# Patient Record
Sex: MALE | Race: OTHER | HISPANIC OR LATINO | URBAN - NONMETROPOLITAN AREA
[De-identification: names, ages, dates, MRNs, and addresses within clinical notes are randomized per-mention and may not be internally consistent; named-entity substitution may affect disease eponyms.]

---

## 2023-05-01 ENCOUNTER — OFFICE VISIT (OUTPATIENT)
Dept: FAMILY MEDICINE CLINIC | Facility: CLINIC | Age: 23
End: 2023-05-01
Payer: MEDICAID

## 2023-05-01 VITALS
BODY MASS INDEX: 28.63 KG/M2 | RESPIRATION RATE: 18 BRPM | SYSTOLIC BLOOD PRESSURE: 148 MMHG | WEIGHT: 216 LBS | TEMPERATURE: 97.8 F | HEART RATE: 86 BPM | HEIGHT: 73 IN | DIASTOLIC BLOOD PRESSURE: 94 MMHG | OXYGEN SATURATION: 99 %

## 2023-05-01 DIAGNOSIS — R52 BODY ACHES: Primary | ICD-10-CM

## 2023-05-01 DIAGNOSIS — U07.1 FEVER DUE TO COVID-19: ICD-10-CM

## 2023-05-01 DIAGNOSIS — R50.81 FEVER DUE TO COVID-19: ICD-10-CM

## 2023-05-01 DIAGNOSIS — U07.1 COVID-19 VIRUS INFECTION: ICD-10-CM

## 2023-05-01 DIAGNOSIS — G44.52 NEW DAILY PERSISTENT HEADACHE: ICD-10-CM

## 2023-05-01 LAB
EXPIRATION DATE: ABNORMAL
FLUAV AG UPPER RESP QL IA.RAPID: NOT DETECTED
FLUBV AG UPPER RESP QL IA.RAPID: NOT DETECTED
INTERNAL CONTROL: ABNORMAL
Lab: ABNORMAL
SARS-COV-2 AG UPPER RESP QL IA.RAPID: DETECTED

## 2023-05-01 PROCEDURE — 99203 OFFICE O/P NEW LOW 30 MIN: CPT | Performed by: PSYCHOLOGIST

## 2023-05-01 PROCEDURE — 87428 SARSCOV & INF VIR A&B AG IA: CPT | Performed by: PSYCHOLOGIST

## 2023-05-01 RX ORDER — PREDNISONE 20 MG/1
20 TABLET ORAL
Qty: 10 TABLET | Refills: 0 | Status: SHIPPED | OUTPATIENT
Start: 2023-05-01 | End: 2023-05-06

## 2023-05-01 RX ORDER — CETIRIZINE HYDROCHLORIDE 10 MG/1
10 TABLET ORAL DAILY
Qty: 14 TABLET | Refills: 0 | Status: SHIPPED | OUTPATIENT
Start: 2023-05-01 | End: 2023-05-01

## 2023-05-01 RX ORDER — PREDNISONE 20 MG/1
20 TABLET ORAL
Qty: 10 TABLET | Refills: 0 | Status: SHIPPED | OUTPATIENT
Start: 2023-05-01 | End: 2023-05-01

## 2023-05-01 RX ORDER — AZITHROMYCIN 250 MG/1
TABLET, FILM COATED ORAL
Qty: 6 TABLET | Refills: 1 | Status: SHIPPED | OUTPATIENT
Start: 2023-05-01

## 2023-05-01 RX ORDER — AZITHROMYCIN 250 MG/1
TABLET, FILM COATED ORAL
Qty: 6 TABLET | Refills: 1 | Status: SHIPPED | OUTPATIENT
Start: 2023-05-01 | End: 2023-05-01

## 2023-05-01 RX ORDER — CETIRIZINE HYDROCHLORIDE 10 MG/1
10 TABLET ORAL DAILY
Qty: 14 TABLET | Refills: 0 | Status: SHIPPED | OUTPATIENT
Start: 2023-05-01 | End: 2023-05-15

## 2023-05-01 NOTE — LETTER
May 1, 2023     Patient: Kike Roche   YOB: 2000   Date of Visit: 5/1/2023       To Whom It May Concern:    It is my medical opinion that Kike Roche may return to work on 5/6/23 .           Sincerely,        This document has been electronically signed by Antwan Young MD  May 1, 2023 13:32 EDT

## 2023-05-01 NOTE — PROGRESS NOTES
"Chief Complaint  Headache (Chills & Body aches. Started yesterday./)    Patient is here for an urgent care/acute visit.  Patient has an established, non-[facility] Primary Care Provider.       Subjective          Kike Roche presents to [facility]   C/o an acute medical visit and no PCP:  (  PRESENT IS HIS COUSIN -   JOHAN CARDOSO)     Sore Throat   This is a new problem. The current episode started yesterday. The problem has been gradually worsening. The pain is moderate. Associated symptoms include headaches and trouble swallowing. Pertinent negatives include no coughing or shortness of breath.   Headache  Headache pattern:  Headache sometimes there, sometimes not at all  Time of day symptoms are worse:  No specific time of day  Do headaches wake patient from sleep?: No    Days of the week symptoms are worse:  No specific day of the week  Season symptoms are worse:  No particular season  Laterality:  Both sides at the same time  Location:  Front/forehead  Headaches last more than three days?: No    Fatigue  This is a new problem. The current episode started yesterday. The problem occurs constantly. The problem has been gradually worsening. Associated symptoms include fatigue, headaches and a sore throat. Pertinent negatives include no coughing. He has tried acetaminophen for the symptoms. The treatment provided mild relief.       Objective   Vital Signs:  /94   Pulse 86   Temp 97.8 °F (36.6 °C) (Temporal)   Resp 18   Ht 185.4 cm (73\")   Wt 98 kg (216 lb)   SpO2 99%   BMI 28.50 kg/m²     BMI:   BMI is >= 25 and <30. (Overweight) The following options were offered after discussion;: exercise counseling/recommendations and nutrition counseling/recommendations      Physical Exam  Vitals and nursing note reviewed.   Constitutional:       Appearance: Normal appearance. He is obese.   HENT:      Head: Normocephalic.      Right Ear: Tympanic membrane normal.      Left Ear: " Tympanic membrane normal.      Nose: Nose normal.      Mouth/Throat:      Mouth: Mucous membranes are moist.   Eyes:      Extraocular Movements: Extraocular movements intact.      Pupils: Pupils are equal, round, and reactive to light.   Cardiovascular:      Rate and Rhythm: Normal rate and regular rhythm.      Pulses: Normal pulses.      Heart sounds: Normal heart sounds.   Pulmonary:      Effort: Pulmonary effort is normal.      Breath sounds: Normal breath sounds.   Abdominal:      General: Abdomen is protuberant. Bowel sounds are normal.      Palpations: Abdomen is soft.   Musculoskeletal:         General: Normal range of motion.      Cervical back: Normal range of motion and neck supple.   Skin:     General: Skin is warm.      Capillary Refill: Capillary refill takes less than 2 seconds.   Neurological:      General: No focal deficit present.      Mental Status: He is alert and oriented to person, place, and time.   Psychiatric:         Mood and Affect: Mood normal.          Result Review :   The following data was reviewed by: Antwan Young MD on 05/01/2023:  LABS: COVID POS / NEG FLU A A & B           Assessment and Plan    Diagnoses and all orders for this visit:    1. Body aches (Primary)  -     POCT SARS-CoV-2 Antigen NORMA + Flu    2. Fever due to COVID-19    3. COVID-19 virus infection    4. New daily persistent headache    Other orders  -     azithromycin (Zithromax) 250 MG tablet; Take 2 tablets the first day, then 1 tablet daily for 4 days.  Dispense: 6 tablet; Refill: 1  -     predniSONE (DELTASONE) 20 MG tablet; Take 1 tablet by mouth Daily With Breakfast & Dinner for 5 days.  Dispense: 10 tablet; Refill: 0  -     cetirizine (zyrTEC) 10 MG tablet; Take 1 tablet by mouth Daily for 14 days.  Dispense: 14 tablet; Refill: 0          I spent 20 minutes caring for Kike on this date of service. This time includes time spent by me in the following activities:reviewing tests, performing a medically  appropriate examination and/or evaluation , counseling and educating the patient/family/caregiver, ordering medications, tests, or procedures and documenting information in the medical record       Follow Up   Return if symptoms worsen or fail to improve/ ER/ RTC.    Patient was given instructions and counseling regarding his condition or for health maintenance advice. Please see specific information pulled into the AVS if appropriate.         This document has been electronically signed by Antwan Young MD  May 1, 2023 13:31 EDT